# Patient Record
Sex: FEMALE | Race: OTHER | URBAN - METROPOLITAN AREA
[De-identification: names, ages, dates, MRNs, and addresses within clinical notes are randomized per-mention and may not be internally consistent; named-entity substitution may affect disease eponyms.]

---
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
## 2020-01-09 ENCOUNTER — EMERGENCY (EMERGENCY)
Facility: HOSPITAL | Age: 20
LOS: 0 days | Discharge: HOME | End: 2020-01-09
Attending: EMERGENCY MEDICINE | Admitting: EMERGENCY MEDICINE
Payer: COMMERCIAL

## 2020-01-09 VITALS
SYSTOLIC BLOOD PRESSURE: 123 MMHG | RESPIRATION RATE: 17 BRPM | WEIGHT: 191.8 LBS | HEART RATE: 86 BPM | TEMPERATURE: 99 F | DIASTOLIC BLOOD PRESSURE: 58 MMHG | OXYGEN SATURATION: 98 %

## 2020-01-09 DIAGNOSIS — R10.9 UNSPECIFIED ABDOMINAL PAIN: ICD-10-CM

## 2020-01-09 DIAGNOSIS — R10.32 LEFT LOWER QUADRANT PAIN: ICD-10-CM

## 2020-01-09 DIAGNOSIS — N83.202 UNSPECIFIED OVARIAN CYST, LEFT SIDE: ICD-10-CM

## 2020-01-09 DIAGNOSIS — R11.0 NAUSEA: ICD-10-CM

## 2020-01-09 DIAGNOSIS — R10.31 RIGHT LOWER QUADRANT PAIN: ICD-10-CM

## 2020-01-09 PROBLEM — Z78.9 OTHER SPECIFIED HEALTH STATUS: Chronic | Status: ACTIVE | Noted: 2019-12-11

## 2020-01-09 LAB
ALBUMIN SERPL ELPH-MCNC: 4.9 G/DL — SIGNIFICANT CHANGE UP (ref 3.5–5.2)
ALP SERPL-CCNC: 55 U/L — SIGNIFICANT CHANGE UP (ref 30–115)
ALT FLD-CCNC: 9 U/L — LOW (ref 14–37)
ANION GAP SERPL CALC-SCNC: 16 MMOL/L — HIGH (ref 7–14)
APPEARANCE UR: ABNORMAL
AST SERPL-CCNC: 19 U/L — SIGNIFICANT CHANGE UP (ref 14–37)
BACTERIA # UR AUTO: ABNORMAL
BASOPHILS # BLD AUTO: 0.03 K/UL — SIGNIFICANT CHANGE UP (ref 0–0.2)
BASOPHILS NFR BLD AUTO: 0.4 % — SIGNIFICANT CHANGE UP (ref 0–1)
BILIRUB SERPL-MCNC: 0.6 MG/DL — SIGNIFICANT CHANGE UP (ref 0.2–1.2)
BILIRUB UR-MCNC: NEGATIVE — SIGNIFICANT CHANGE UP
BUN SERPL-MCNC: 11 MG/DL — SIGNIFICANT CHANGE UP (ref 10–20)
CALCIUM SERPL-MCNC: 9.8 MG/DL — SIGNIFICANT CHANGE UP (ref 8.5–10.1)
CHLORIDE SERPL-SCNC: 104 MMOL/L — SIGNIFICANT CHANGE UP (ref 98–110)
CO2 SERPL-SCNC: 20 MMOL/L — SIGNIFICANT CHANGE UP (ref 17–32)
COLOR SPEC: YELLOW — SIGNIFICANT CHANGE UP
CREAT SERPL-MCNC: 0.7 MG/DL — SIGNIFICANT CHANGE UP (ref 0.3–1)
DIFF PNL FLD: ABNORMAL
EOSINOPHIL # BLD AUTO: 0.13 K/UL — SIGNIFICANT CHANGE UP (ref 0–0.7)
EOSINOPHIL NFR BLD AUTO: 1.8 % — SIGNIFICANT CHANGE UP (ref 0–8)
EPI CELLS # UR: SIGNIFICANT CHANGE UP /HPF (ref 0–5)
GLUCOSE SERPL-MCNC: 88 MG/DL — SIGNIFICANT CHANGE UP (ref 70–99)
GLUCOSE UR QL: NEGATIVE — SIGNIFICANT CHANGE UP
HCT VFR BLD CALC: 40.9 % — SIGNIFICANT CHANGE UP (ref 37–47)
HGB BLD-MCNC: 13.9 G/DL — SIGNIFICANT CHANGE UP (ref 12–16)
IMM GRANULOCYTES NFR BLD AUTO: 0.3 % — SIGNIFICANT CHANGE UP (ref 0.1–0.3)
KETONES UR-MCNC: ABNORMAL
LEUKOCYTE ESTERASE UR-ACNC: NEGATIVE — SIGNIFICANT CHANGE UP
LIDOCAIN IGE QN: 14 U/L — SIGNIFICANT CHANGE UP (ref 7–60)
LYMPHOCYTES # BLD AUTO: 2.08 K/UL — SIGNIFICANT CHANGE UP (ref 1.2–3.4)
LYMPHOCYTES # BLD AUTO: 28.9 % — SIGNIFICANT CHANGE UP (ref 20.5–51.1)
MCHC RBC-ENTMCNC: 31.4 PG — HIGH (ref 27–31)
MCHC RBC-ENTMCNC: 34 G/DL — SIGNIFICANT CHANGE UP (ref 32–37)
MCV RBC AUTO: 92.5 FL — SIGNIFICANT CHANGE UP (ref 81–99)
MONOCYTES # BLD AUTO: 0.37 K/UL — SIGNIFICANT CHANGE UP (ref 0.1–0.6)
MONOCYTES NFR BLD AUTO: 5.1 % — SIGNIFICANT CHANGE UP (ref 1.7–9.3)
NEUTROPHILS # BLD AUTO: 4.56 K/UL — SIGNIFICANT CHANGE UP (ref 1.4–6.5)
NEUTROPHILS NFR BLD AUTO: 63.5 % — SIGNIFICANT CHANGE UP (ref 42.2–75.2)
NITRITE UR-MCNC: NEGATIVE — SIGNIFICANT CHANGE UP
NRBC # BLD: 0 /100 WBCS — SIGNIFICANT CHANGE UP (ref 0–0)
PH UR: 5.5 — SIGNIFICANT CHANGE UP (ref 5–8)
PLATELET # BLD AUTO: 161 K/UL — SIGNIFICANT CHANGE UP (ref 130–400)
POTASSIUM SERPL-MCNC: 4.1 MMOL/L — SIGNIFICANT CHANGE UP (ref 3.5–5)
POTASSIUM SERPL-SCNC: 4.1 MMOL/L — SIGNIFICANT CHANGE UP (ref 3.5–5)
PROT SERPL-MCNC: 8 G/DL — SIGNIFICANT CHANGE UP (ref 6.1–8)
PROT UR-MCNC: ABNORMAL
RBC # BLD: 4.42 M/UL — SIGNIFICANT CHANGE UP (ref 4.2–5.4)
RBC # FLD: 12.7 % — SIGNIFICANT CHANGE UP (ref 11.5–14.5)
RBC CASTS # UR COMP ASSIST: SIGNIFICANT CHANGE UP /HPF (ref 0–4)
SODIUM SERPL-SCNC: 140 MMOL/L — SIGNIFICANT CHANGE UP (ref 135–146)
SP GR SPEC: 1.03 — HIGH (ref 1.01–1.02)
UROBILINOGEN FLD QL: SIGNIFICANT CHANGE UP
WBC # BLD: 7.19 K/UL — SIGNIFICANT CHANGE UP (ref 4.8–10.8)
WBC # FLD AUTO: 7.19 K/UL — SIGNIFICANT CHANGE UP (ref 4.8–10.8)
WBC UR QL: SIGNIFICANT CHANGE UP /HPF (ref 0–5)

## 2020-01-09 PROCEDURE — 74177 CT ABD & PELVIS W/CONTRAST: CPT | Mod: 26

## 2020-01-09 PROCEDURE — 99242 OFF/OP CONSLTJ NEW/EST SF 20: CPT

## 2020-01-09 PROCEDURE — 76856 US EXAM PELVIC COMPLETE: CPT | Mod: 26

## 2020-01-09 PROCEDURE — 99284 EMERGENCY DEPT VISIT MOD MDM: CPT

## 2020-01-09 RX ORDER — SODIUM CHLORIDE 9 MG/ML
1000 INJECTION INTRAMUSCULAR; INTRAVENOUS; SUBCUTANEOUS ONCE
Refills: 0 | Status: COMPLETED | OUTPATIENT
Start: 2020-01-09 | End: 2020-01-09

## 2020-01-09 RX ORDER — IOHEXOL 300 MG/ML
30 INJECTION, SOLUTION INTRAVENOUS ONCE
Refills: 0 | Status: COMPLETED | OUTPATIENT
Start: 2020-01-09 | End: 2020-01-09

## 2020-01-09 RX ORDER — ONDANSETRON 8 MG/1
4 TABLET, FILM COATED ORAL ONCE
Refills: 0 | Status: COMPLETED | OUTPATIENT
Start: 2020-01-09 | End: 2020-01-09

## 2020-01-09 RX ADMIN — SODIUM CHLORIDE 1000 MILLILITER(S): 9 INJECTION INTRAMUSCULAR; INTRAVENOUS; SUBCUTANEOUS at 16:25

## 2020-01-09 RX ADMIN — SODIUM CHLORIDE 1000 MILLILITER(S): 9 INJECTION INTRAMUSCULAR; INTRAVENOUS; SUBCUTANEOUS at 17:50

## 2020-01-09 RX ADMIN — ONDANSETRON 8 MILLIGRAM(S): 8 TABLET, FILM COATED ORAL at 16:25

## 2020-01-09 RX ADMIN — IOHEXOL 30 MILLILITER(S): 300 INJECTION, SOLUTION INTRAVENOUS at 16:25

## 2020-01-09 RX ADMIN — ONDANSETRON 4 MILLIGRAM(S): 8 TABLET, FILM COATED ORAL at 16:40

## 2020-01-09 NOTE — ED PROVIDER NOTE - PHYSICAL EXAMINATION
CONST: well appearing for age  HEAD:  normocephalic, atraumatic  EYES:  conjunctivae without injection, drainage or discharge  ENMT: nasal mucosa moist; mouth moist without ulcerations or lesions; throat moist without erythema, exudate, ulcerations or lesions  NECK:  supple, no masses, no significant lymphadenopathy  CARDIAC:  regular rate and rhythm, normal S1 and S2, no murmurs, rubs or gallops  RESP:  respiratory rate and effort appear normal for age; lungs are clear to auscultation bilaterally; no rales or wheezes  ABDOMEN:  soft, RLQ tenderness, nondistended, no masses, no organomegaly, no rebound tenderness or guarding, no CVAT, negative Rovsing's and Obturator signs  LYMPHATICS:  no significant lymphadenopathy  MUSCULOSKELETAL/NEURO:  normal movement, normal tone, shoulders nontender b/l with full movement  SKIN:  normal skin color for age and race, well-perfused; warm and dry CONST: well appearing for age  HEAD:  normocephalic, atraumatic  EYES:  conjunctivae without injection, drainage or discharge  ENMT: nasal mucosa moist; mouth moist without ulcerations or lesions; throat moist without erythema, exudate, ulcerations or lesions  NECK:  supple, no masses, no significant lymphadenopathy  CARDIAC:  regular rate and rhythm, normal S1 and S2, no murmurs, rubs or gallops  RESP:  respiratory rate and effort appear normal for age; lungs are clear to auscultation bilaterally; no rales or wheezes  ABDOMEN:  soft, RLQ tenderness and some LLQ tenderness, nondistended, no masses, no organomegaly, no rebound tenderness or guarding, no CVAT, negative Rovsing's and Obturator signs  LYMPHATICS:  no significant lymphadenopathy  MUSCULOSKELETAL/NEURO:  normal movement, normal tone, shoulders nontender b/l with full movement  SKIN:  normal skin color for age and race, well-perfused; warm and dry

## 2020-01-09 NOTE — CONSULT NOTE PEDS - ASSESSMENT
18yo nulligravid F, with lower abdominal pain, now resolved, left hemorrhagic cyst on ultrasound, clinically and hemodynamically stable    -reassurance given to patient  -disposition per ED staff 20yo nulligravid F, with lower abdominal pain, now resolved, left hemorrhagic cyst on ultrasound, clinically and hemodynamically stable    -reassurance given to patient  -recommend follow up with [facility] for re-assessment of ovarian cyst  -Gc/CT drawn today, GYN will follow   -disposition per ED staff    Dr. Ackerman and Dr. Crain aware 18yo nulligravid F, with lower abdominal pain, now resolved, left ovarian cyst on imaging, likely physiological, clinically and hemodynamically stable    -reassurance given to patient  -recommend NSAIDs for pain relief  -will schedule for f/u at [facility], will repeat imaging 4-6wks for cyst resolution  -Gc/CT collected today, GYN will follow   -disposition per ED staff    Dr. Ackerman and Dr. Crain aware

## 2020-01-09 NOTE — ED PROVIDER NOTE - ATTENDING CONTRIBUTION TO CARE
18 yo F with no PMH, here with sudden onset RLQ pain at 3 PM, cramping, on and off, was 10/10, radiating up to right shoulder when severe, and with trouble breathing due to pain in abdomen. Nausea, but no vomiting. Some chills. Pt is on birth control, menstruating currently. LMP 12/1. Pt had 2 episodes of bleeding last month. Started OCPs in September. No fever. No diarrhea. No rash. Took tylenol at 3:15 with mild improvement. Pain is now 2/10, improved en route via ambulance. Sexually active, no condom use. Denies vaginal or urinary symptoms. Exam - Gen - NAD, Head - NCAT, TMs - clear b/l, Pharynx - clear, MMM, Heart - RRR, no m/g/r, Lungs - CTAB, no w/c/r, Abdomen - soft, tender to RLQ, (+) McBurney's point, with mild LLQ tenderness, no rebound, negative psoas, obturator, neg Rovsing, ND, Skin - No rash, Extremities - FROM, no edema, erythema, ecchymosis, Neuro - CN 2-12 intact, nl strength and sensation, nl gait. Plan - urine, upreg, US pelvis, CT to r/o appendicitis, labs, IV. 18 yo F with no PMH, here with sudden onset RLQ pain at 3 PM, cramping, on and off, was 10/10, radiating up to right shoulder when severe, and with trouble breathing due to pain in abdomen. Nausea, but no vomiting. Some chills. Pt is on birth control, menstruating currently. LMP 12/1. Pt had 2 episodes of bleeding last month. Started OCPs in September. No fever. No diarrhea. No rash. Took tylenol at 3:15 with mild improvement. Pain is now 2/10, improved en route via ambulance. Sexually active, no condom use. Denies vaginal or urinary symptoms. Exam - Gen - NAD, Head - NCAT, TMs - clear b/l, Pharynx - clear, MMM, Heart - RRR, no m/g/r, Lungs - CTAB, no w/c/r, Abdomen - soft, tender to RLQ, (+) McBurney's point, with mild LLQ tenderness, no rebound, negative psoas, obturator, neg Rovsing, ND, Skin - No rash, Extremities - FROM, no edema, erythema, ecchymosis, Neuro - CN 2-12 intact, nl strength and sensation, nl gait. Plan - urine, upreg, US pelvis, CT to r/o appendicitis, labs, IV. US and CT revealed 4.5 cm left ovarian cyst, no torsion. CT negative for appendicitis. GYN consulted due to size of cyst (6 cm on CT). GYN cleared for d/c home with f/u outpatient. Dx - left ovarian hemorrhagic cyst. Given strict return precautions.

## 2020-01-09 NOTE — CONSULT NOTE PEDS - SUBJECTIVE AND OBJECTIVE BOX
Chief complaint: lower abdominal pain    HPI:  20yo nulligravid F, with LMP 2020, presented to ED for RLQ pain. It started @1500, suddenly, radiating to right shoulder, sharp, /10 at that time. She took Tylenol 975mg @1515, with relief of symptoms by 1530. Currently rates pain /10. Denies fever, chills, SOB, CP, n/v, constipation, diarrhea, dysuria, abnormal vaginal discharge. She is currently on her period. Menarche was at age 12, periods are q28 days, last 5 days, heavy in first 2. She uses OCP (Ju) for contraception, has one male partner, does not use barrier contraception. [facility]. Denies any other complaints.    Ob/Gyn History:  G0                 LMP -     2020              Cycle Length - 5-6 days  Denies history of ovarian cysts, uterine fibroids, abnormal paps, or STIs      Denies the following: constitutional symptoms, visual symptoms, cardiovascular symptoms, respiratory symptoms, GI symptoms, musculoskeletal symptoms, skin symptoms, neurologic symptoms, hematologic symptoms, allergic symptoms, psychiatric symptoms  Except any pertinent positives listed.     Home Medications:   OCP (Ju)    No Known Allergies    PAST MEDICAL & SURGICAL HISTORY:  No pertinent past medical history  No significant past surgical history      FAMILY HISTORY: Denies family Hx      SOCIAL HISTORY: Denies cigarette use, alcohol use, or illicit drug use    Vital Signs Last 24 Hrs  T(F): 98.6 (2020 15:48), Max: 98.6 (2020 15:48)  HR: 86 (2020 15:48) (86 - 86)  BP: 123/58 (2020 15:48) (123/58 - 123/58)  RR: 17 (2020 15:48) (17 - 17)    General Appearance - AAOx3, NAD  Heart - S1S2 regular rate and rhythm  Lung - CTA Bilaterally  Abdomen - Soft, nontender, nondistended, no rebound, no rigidity, no guarding, bowel sounds present    GYN/Pelvis:    Labia Majora - Normal  Labia Minora - Normal  Clitoris - Normal  Urethra - Normal  Vagina - Normal  Cervix - Normal    Uterus:  Size - Normal  Tenderness - None  Mass - None  Freely mobile    Adnexa:  Masses - None  Tenderness - None      LABS:                        13.9   7.19  )-----------( 161      ( 2020 16:40 )             40.9             140  |  104  |  11  ----------------------------<  88  4.1   |  20  |  0.7    Ca    9.8      2020 16:40    TPro  8.0  /  Alb  4.9  /  TBili  0.6  /  DBili  x   /  AST  19  /  ALT  9<L>  /  AlkPhos  55        Urinalysis Basic - ( 2020 16:40 )    Color: Yellow / Appearance: Slightly Turbid / S.027 / pH: x  Gluc: x / Ketone: Moderate  / Bili: Negative / Urobili: <2 mg/dL   Blood: x / Protein: 30 mg/dL / Nitrite: Negative   Leuk Esterase: Negative / RBC: 4-6 /HPF / WBC 3-5 /HPF   Sq Epi: x / Non Sq Epi: Moderate /HPF / Bacteria: Few          RADIOLOGY & ADDITIONAL STUDIES:    EXAM:  US PELVIC COMPLETE            PROCEDURE DATE:  2020            INTERPRETATION:  CLINICAL HISTORY: Right lower quadrant abdominal pain..    COMPARISON: Correlation made    PROCEDURE: Transabdominal ultrasound of the pelvis was performed, including Doppler.    LMP: 2020     FINDINGS:    UTERUS: Anteverted measuring 6.9 x 3.2 x 2.9 cm, with normal echogenicity and morphology. The endometrial echo complex measures 0.4 cm, which is normal in thickness.     RIGHT OVARY: measures 2.5 x 1.8 x 1.9 cm, and is unremarkable. Doppler flow is demonstrated to the right ovary.     LEFT OVARY: measures 4.8 x 5.1 x 3.5 cm. A a 4.5 x 4.0 x 3.2 cm cyst within the left ovary contains a layering echogenic material. Doppler flow is demonstrated to the left ovary.     OTHER: No free fluid in the pelvis.    IMPRESSION:  1.  No sonographic evidence for ovarian torsion.    2.  Left ovarian 4.5 cm cyst containing echogenic material, which may represent a retracted clot in a hemorrhagic cyst.      CT SCAN:  INTERPRETATION:  CLINICAL STATEMENT: Abdominal pain. Concern for appendicitis      TECHNIQUE: Contiguous axial CT images were obtained from the lower chest to the pubic symphysis following administration of 100cc Optiray 320 intravenous contrast.  Oral contrast was administered.  Reformatted images in the coronal and sagittal planes were acquired.    COMPARISON CT: None.    OTHER STUDIES USED FOR CORRELATION: None.       FINDINGS:    LOWER CHEST: Unremarkable.    HEPATOBILIARY: Unremarkable. No calcified gallstones are noted..    SPLEEN: Unremarkable.    PANCREAS: Unremarkable. . No evidence of pancreatitis    ADRENAL GLANDS: Unremarkable.    KIDNEYS: Symmetric bilateral renal enhancement. No hydronephrosis.    ABDOMINOPELVIC NODES: Unremarkable.    PELVIC ORGANS: Left-sided simple ovarian cyst measuring 6 x 4 cm.     PERITONEUM/MESENTERY/BOWEL: No bowel obstruction, pneumoperitoneum or ascites. There is no evidence of appendicitis at this time. The appendix is not dilated nor are there are inflammatory changes around the appendix. A small linear density is noted within the appendix which most likely represents small amount of the oral contrast which entered the appendix. Less likely to be an appendicolith. (5/342). Oral contrast visualized to the level of the transverse colon.    BONES/SOFT TISSUES: Unremarkable.    IMPRESSION:   1.  No evidence of appendicitis.  2.  Left-sided simple ovarian cyst measuring 6 cm. No evidence of surrounding inflammatory change or free fluid within the pelvis. Chief complaint: lower abdominal pain    HPI:  20yo nulligravid F, with LMP 2020, presented to ED for RLQ pain. It started @1500, suddenly, radiating to right shoulder, sharp, 9/10 at that time. She took Tylenol 975mg @1515, with relief of symptoms by 1530. Currently rates pain /10. Denies fever, chills, SOB, CP, n/v, constipation, diarrhea, dysuria, abnormal vaginal discharge. She is currently on her period. Menarche was at age 12, periods are q28 days, last 5 days, heavy in first 2. She uses OCP (Ju) for contraception, has one male partner, does not use barrier contraception. [facility]. Denies any other complaints.    Ob/Gyn History:  G0                 LMP -     2020              Cycle Length - 5-6 days  Denies history of ovarian cysts, uterine fibroids, abnormal paps, or STIs      Denies the following: constitutional symptoms, visual symptoms, cardiovascular symptoms, respiratory symptoms, GI symptoms, musculoskeletal symptoms, skin symptoms, neurologic symptoms, hematologic symptoms, allergic symptoms, psychiatric symptoms  Except any pertinent positives listed.     Home Medications:   OCP (Ju)    No Known Allergies    PAST MEDICAL & SURGICAL HISTORY:  No pertinent past medical history  No significant past surgical history      FAMILY HISTORY: Denies family Hx      SOCIAL HISTORY: Denies cigarette use, alcohol use, or illicit drug use    Vital Signs Last 24 Hrs  T(F): 98.6 (2020 15:48), Max: 98.6 (2020 15:48)  HR: 86 (2020 15:48) (86 - 86)  BP: 123/58 (2020 15:48) (123/58 - 123/58)  RR: 17 (2020 15:48) (17 - 17)    General Appearance - AAOx3, NAD  Heart - S1S2 regular rate and rhythm  Lung - CTA Bilaterally  Abdomen - Soft, nontender, nondistended, no rebound, no rigidity, no guarding, bowel sounds present    GYN/Pelvis:    Labia Majora - Normal  Labia Minora - Normal  Clitoris - Normal  Urethra - Normal  Vagina - Normal  Cervix - Normal, blood seen at os    Uterus:  Size - Normal  Tenderness - None  Mass - None  Freely mobile    Adnexa:  Masses - None  Tenderness - None      LABS:                        13.9   7.19  )-----------( 161      ( 2020 16:40 )             40.9             140  |  104  |  11  ----------------------------<  88  4.1   |  20  |  0.7    Ca    9.8      2020 16:40    TPro  8.0  /  Alb  4.9  /  TBili  0.6  /  DBili  x   /  AST  19  /  ALT  9<L>  /  AlkPhos  55        Urinalysis Basic - ( 2020 16:40 )    Color: Yellow / Appearance: Slightly Turbid / S.027 / pH: x  Gluc: x / Ketone: Moderate  / Bili: Negative / Urobili: <2 mg/dL   Blood: x / Protein: 30 mg/dL / Nitrite: Negative   Leuk Esterase: Negative / RBC: 4-6 /HPF / WBC 3-5 /HPF   Sq Epi: x / Non Sq Epi: Moderate /HPF / Bacteria: Few    Upreg: neg          RADIOLOGY & ADDITIONAL STUDIES:    EXAM:  US PELVIC COMPLETE            PROCEDURE DATE:  2020            INTERPRETATION:  CLINICAL HISTORY: Right lower quadrant abdominal pain..    COMPARISON: Correlation made    PROCEDURE: Transabdominal ultrasound of the pelvis was performed, including Doppler.    LMP: 2020     FINDINGS:    UTERUS: Anteverted measuring 6.9 x 3.2 x 2.9 cm, with normal echogenicity and morphology. The endometrial echo complex measures 0.4 cm, which is normal in thickness.     RIGHT OVARY: measures 2.5 x 1.8 x 1.9 cm, and is unremarkable. Doppler flow is demonstrated to the right ovary.     LEFT OVARY: measures 4.8 x 5.1 x 3.5 cm. A a 4.5 x 4.0 x 3.2 cm cyst within the left ovary contains a layering echogenic material. Doppler flow is demonstrated to the left ovary.     OTHER: No free fluid in the pelvis.    IMPRESSION:  1.  No sonographic evidence for ovarian torsion.    2.  Left ovarian 4.5 cm cyst containing echogenic material, which may represent a retracted clot in a hemorrhagic cyst.      CT SCAN:  INTERPRETATION:  CLINICAL STATEMENT: Abdominal pain. Concern for appendicitis      TECHNIQUE: Contiguous axial CT images were obtained from the lower chest to the pubic symphysis following administration of 100cc Optiray 320 intravenous contrast.  Oral contrast was administered.  Reformatted images in the coronal and sagittal planes were acquired.    COMPARISON CT: None.    OTHER STUDIES USED FOR CORRELATION: None.       FINDINGS:    LOWER CHEST: Unremarkable.    HEPATOBILIARY: Unremarkable. No calcified gallstones are noted..    SPLEEN: Unremarkable.    PANCREAS: Unremarkable. . No evidence of pancreatitis    ADRENAL GLANDS: Unremarkable.    KIDNEYS: Symmetric bilateral renal enhancement. No hydronephrosis.    ABDOMINOPELVIC NODES: Unremarkable.    PELVIC ORGANS: Left-sided simple ovarian cyst measuring 6 x 4 cm.     PERITONEUM/MESENTERY/BOWEL: No bowel obstruction, pneumoperitoneum or ascites. There is no evidence of appendicitis at this time. The appendix is not dilated nor are there are inflammatory changes around the appendix. A small linear density is noted within the appendix which most likely represents small amount of the oral contrast which entered the appendix. Less likely to be an appendicolith. (5/342). Oral contrast visualized to the level of the transverse colon.    BONES/SOFT TISSUES: Unremarkable.    IMPRESSION:   1.  No evidence of appendicitis.  2.  Left-sided simple ovarian cyst measuring 6 cm. No evidence of surrounding inflammatory change or free fluid within the pelvis.

## 2020-01-09 NOTE — ED PROVIDER NOTE - PATIENT PORTAL LINK FT
You can access the FollowMyHealth Patient Portal offered by Health system by registering at the following website: [email or web address]. By joining [employer]’s FollowMyHealth portal, you will also be able to view your health information using other applications (apps) compatible with our system.

## 2020-01-09 NOTE — ED PROVIDER NOTE - PROGRESS NOTE DETAILS
Will get labs, urinalysis, upreg, CT Abd/Pelvis and pelvic ultrasound Abdominal pain improved, awaiting CT and US CT shows 6 cm left ovarian cyst, US shows 4.5 cm left ovarian cyst, GYN paged and consulted, awaiting call back GYN paged 2nd time, said that they would come, still did not see patient yet GYN saw patient, recommends outpatient f/u with GYN and PMD

## 2020-01-09 NOTE — ED PROVIDER NOTE - OBJECTIVE STATEMENT
The patient is a 20 yo female with no PMHx complaining of abdominal pain at 3 PM today. The patient was fine this morning, but at 3 PM she began with sudden severe RLQ abdominal pain crampy and comes and goes. She says that it radiates up to right shoulder and that the pain gets worse with deep breaths. Patient took 2x of Tylenol at 3:15 PM with relief of pain. Pain was 10/10, but in the ambulance, the pain improved to 2/10. Patient has nause, but no vomiting. Denies fever, chills, chest pain, SOB, diarrhea, rash, vaginal d/c, dysuria, urinary frequency. The patient is on menstrual period now, 3 days now. Last LMP was in 1st week and 2nd week of December which is unusual, but she is on OCP since September. Patient sexually active with one partner, no condom use. Otherwise, patient eating/drinking normally, voiding well, normaly activity. Up to date on immunizations.

## 2020-01-09 NOTE — ED PROVIDER NOTE - CLINICAL SUMMARY MEDICAL DECISION MAKING FREE TEXT BOX
18 yo F with no PMH, here with sudden onset RLQ pain at 3 PM, cramping, on and off, was 10/10, radiating up to right shoulder when severe, and with trouble breathing due to pain in abdomen. Nausea, but no vomiting. Some chills. Pt is on birth control, menstruating currently. LMP 12/1. Pt had 2 episodes of bleeding last month. Started OCPs in September. No fever. No diarrhea. No rash. Took tylenol at 3:15 with mild improvement. Pain is now 2/10, improved en route via ambulance. Sexually active, no condom use. Denies vaginal or urinary symptoms. Exam - Gen - NAD, Head - NCAT, TMs - clear b/l, Pharynx - clear, MMM, Heart - RRR, no m/g/r, Lungs - CTAB, no w/c/r, Abdomen - soft, tender to RLQ, (+) McBurney's point, with mild LLQ tenderness, no rebound, negative psoas, obturator, neg Rovsing, ND, Skin - No rash, Extremities - FROM, no edema, erythema, ecchymosis, Neuro - CN 2-12 intact, nl strength and sensation, nl gait. Plan - urine, upreg, US pelvis, CT to r/o appendicitis, labs, IV. US and CT revealed 4.5 cm left ovarian cyst, no torsion. CT negative for appendicitis. GYN consulted due to size of cyst (6 cm on CT). GYN cleared for d/c home with f/u outpatient. Dx - left ovarian hemorrhagic cyst. Given strict return precautions.

## 2020-01-09 NOTE — ED PEDIATRIC NURSE NOTE - OBJECTIVE STATEMENT
BIBA for worsening RUQ pain. As per pt, pain started around 3pm. Pain noted as crampy and intermittent, but when it occurs it radiates up the right side of the body to the shoulder. Currently menstruating. Endorses abnormal pattern of periods, however, is on OCP since September.  Denies n/v/diarrhea. Alert and oriented x3. No obvious trauma or deformities noted.

## 2020-01-09 NOTE — ED PEDIATRIC NURSE NOTE - NSIMPLEMENTINTERV_GEN_ALL_ED
Implemented All Universal Safety Interventions:  [place] to call system. Call bell, personal items and telephone within reach. Instruct patient to call for assistance. Room bathroom lighting operational. Non-slip footwear when patient is off stretcher. Physically safe environment: no spills, clutter or unnecessary equipment. Stretcher in lowest position, wheels locked, appropriate side rails in place.

## 2020-01-09 NOTE — ED PROVIDER NOTE - NS ED ROS FT
Constitutional: See HPI.  Pt eating and drinking normally and having normal urine and BM output.  Eyes: No discharge, erythema, pain, vision changes.  ENMT: No URI symptoms. No neck pain or stiffness.  Cardiac: No hx of known congenital defects. No CP, SOB  Respiratory: No cough, stridor, or respiratory distress.   GI: + nausea, no vomiting, no diarrhea, +RLQ abdominal pain  : Normal frequency. No foul smelling urine. No dysuria.   MS: No muscle weakness, myalgia, joint pain, back pain  Neuro: No headache or weakness. No LOC.  Skin: No skin rash.

## 2020-01-09 NOTE — ED PROVIDER NOTE - CARE PROVIDER_API CALL
your pediatrician,   Phone: (   )    -  Fax: (   )    -  Follow Up Time:     your GYN doctor,   Phone: (   )    -  Fax: (   )    -  Follow Up Time:

## 2020-01-09 NOTE — ED PROVIDER NOTE - PROVIDER TOKENS
FREE:[LAST:[your pediatrician],PHONE:[(   )    -],FAX:[(   )    -]],FREE:[LAST:[your GYN doctor],PHONE:[(   )    -],FAX:[(   )    -]]

## 2020-01-09 NOTE — ED PROVIDER NOTE - NSFOLLOWUPINSTRUCTIONS_ED_ALL_ED_FT
Ovarian Cyst            An ovarian cyst is a fluid-filled sac that forms on an ovary. The ovaries are small organs that produce eggs in women. Various types of cysts can form on the ovaries. Some may cause symptoms and require treatment. Most ovarian cysts go away on their own, are not cancerous (are benign), and do not cause problems.  Common types of ovarian cysts include:  Functional (follicle) cysts.  Occur during the menstrual cycle, and usually go away with the next menstrual cycle if you do not get pregnant.Usually cause no symptoms.Endometriomas.  Are cysts that form from the tissue that lines the uterus (endometrium).Are sometimes called “chocolate cysts” because they become filled with blood that turns brown.Can cause pain in the lower abdomen during intercourse and during your period.Cystadenoma cysts.  Develop from cells on the outside surface of the ovary.Can get very large and cause lower abdomen pain and pain with intercourse.Can cause severe pain if they twist or break open (rupture).Dermoid cysts.  Are sometimes found in both ovaries.May contain different kinds of body tissue, such as skin, teeth, hair, or cartilage.Usually do not cause symptoms unless they get very big.Theca lutein cysts.  Occur when too much of a certain hormone (human chorionic gonadotropin) is produced and overstimulates the ovaries to produce an egg.Are most common after having procedures used to assist with the conception of a baby (in vitro fertilization).What are the causes?  Ovarian cysts may be caused by:  Ovarian hyperstimulation syndrome. This is a condition that can develop from taking fertility medicines. It causes multiple large ovarian cysts to form.Polycystic ovarian syndrome (PCOS). This is a common hormonal disorder that can cause ovarian cysts, as well as problems with your period or fertility.What increases the risk?  The following factors may make you more likely to develop ovarian cysts:  Being overweight or obese.Taking fertility medicines.Taking certain forms of hormonal birth control.Smoking.What are the signs or symptoms?  Many ovarian cysts do not cause symptoms. If symptoms are present, they may include:  Pelvic pain or pressure.Pain in the lower abdomen.Pain during sex.Abdominal swelling.Abnormal menstrual periods.Increasing pain with menstrual periods.How is this diagnosed?  These cysts are commonly found during a routine pelvic exam. You may have tests to find out more about the cyst, such as:  Ultrasound.X-ray of the pelvis.CT scan.MRI.Blood tests.How is this treated?  Many ovarian cysts go away on their own without treatment. Your health care provider may want to check your cyst regularly for 2–3 months to see if it changes. If you are in menopause, it is especially important to have your cyst monitored closely because menopausal women have a higher rate of ovarian cancer.  When treatment is needed, it may include:  Medicines to help relieve pain.A procedure to drain the cyst (aspiration).Surgery to remove the whole cyst.Hormone treatment or birth control pills. These methods are sometimes used to help dissolve a cyst.Follow these instructions at home:  Take over-the-counter and prescription medicines only as told by your health care provider.Do not drive or use heavy machinery while taking prescription pain medicine.Get regular pelvic exams and Pap tests as often as told by your health care provider.Return to your normal activities as told by your health care provider. Ask your health care provider what activities are safe for you.Do not use any products that contain nicotine or tobacco, such as cigarettes and e-cigarettes. If you need help quitting, ask your health care provider.Keep all follow-up visits as told by your health care provider. This is important.Contact a health care provider if:  Your periods are late, irregular, or painful, or they stop.You have pelvic pain that does not go away.You have pressure on your bladder or trouble emptying your bladder completely.You have pain during sex.You have any of the following in your abdomen:  A feeling of fullness.Pressure.Discomfort.Pain that does not go away.Swelling.You feel generally ill.You become constipated.You lose your appetite.You develop severe acne.You start to have more body hair and facial hair.You are gaining weight or losing weight without changing your exercise and eating habits.You think you may be pregnant.Get help right away if:  You have abdominal pain that is severe or gets worse.You cannot eat or drink without vomiting.You suddenly develop a fever.Your menstrual period is much heavier than usual.This information is not intended to replace advice given to you by your health care provider. Make sure you discuss any questions you have with your health care provider.    Please follow up with your GYN or primary care doctor in 2 to 4 weeks.

## 2020-01-10 LAB
C TRACH RRNA SPEC QL NAA+PROBE: SIGNIFICANT CHANGE UP
N GONORRHOEA RRNA SPEC QL NAA+PROBE: SIGNIFICANT CHANGE UP
SPECIMEN SOURCE: SIGNIFICANT CHANGE UP

## 2020-01-11 LAB
CULTURE RESULTS: SIGNIFICANT CHANGE UP
SPECIMEN SOURCE: SIGNIFICANT CHANGE UP

## 2020-01-22 PROBLEM — Z00.00 ENCOUNTER FOR PREVENTIVE HEALTH EXAMINATION: Status: ACTIVE | Noted: 2020-01-22

## 2020-01-28 ENCOUNTER — APPOINTMENT (OUTPATIENT)
Dept: OBGYN | Facility: CLINIC | Age: 20
End: 2020-01-28

## 2020-02-21 ENCOUNTER — APPOINTMENT (OUTPATIENT)
Dept: OBGYN | Facility: CLINIC | Age: 20
End: 2020-02-21

## 2025-02-27 NOTE — ED PEDIATRIC NURSE NOTE - NSFALLRSKINDICATORS_ED_ALL_ED
1) Take prescription medication  2) Follow-up with urology if symptoms persist or recur  3) Follow up with your primary care doctor  4) Return to the ER for worsening or concerning symptoms
no